# Patient Record
Sex: FEMALE | Race: WHITE
[De-identification: names, ages, dates, MRNs, and addresses within clinical notes are randomized per-mention and may not be internally consistent; named-entity substitution may affect disease eponyms.]

---

## 2018-06-01 ENCOUNTER — HOSPITAL ENCOUNTER (INPATIENT)
Dept: HOSPITAL 75 - ER | Age: 73
LOS: 2 days | Discharge: HOME | DRG: 63 | End: 2018-06-03
Attending: FAMILY MEDICINE | Admitting: FAMILY MEDICINE
Payer: MEDICARE

## 2018-06-01 VITALS — DIASTOLIC BLOOD PRESSURE: 63 MMHG | SYSTOLIC BLOOD PRESSURE: 164 MMHG

## 2018-06-01 VITALS — SYSTOLIC BLOOD PRESSURE: 120 MMHG | DIASTOLIC BLOOD PRESSURE: 62 MMHG

## 2018-06-01 VITALS — SYSTOLIC BLOOD PRESSURE: 131 MMHG | DIASTOLIC BLOOD PRESSURE: 74 MMHG

## 2018-06-01 VITALS — SYSTOLIC BLOOD PRESSURE: 115 MMHG | DIASTOLIC BLOOD PRESSURE: 64 MMHG

## 2018-06-01 VITALS — SYSTOLIC BLOOD PRESSURE: 126 MMHG | DIASTOLIC BLOOD PRESSURE: 69 MMHG

## 2018-06-01 VITALS — WEIGHT: 147 LBS | HEIGHT: 66 IN | BODY MASS INDEX: 23.63 KG/M2

## 2018-06-01 VITALS — DIASTOLIC BLOOD PRESSURE: 85 MMHG | SYSTOLIC BLOOD PRESSURE: 165 MMHG

## 2018-06-01 VITALS — SYSTOLIC BLOOD PRESSURE: 134 MMHG | DIASTOLIC BLOOD PRESSURE: 74 MMHG

## 2018-06-01 VITALS — DIASTOLIC BLOOD PRESSURE: 74 MMHG | SYSTOLIC BLOOD PRESSURE: 145 MMHG

## 2018-06-01 VITALS — SYSTOLIC BLOOD PRESSURE: 146 MMHG | DIASTOLIC BLOOD PRESSURE: 95 MMHG

## 2018-06-01 DIAGNOSIS — R29.709: ICD-10-CM

## 2018-06-01 DIAGNOSIS — R03.0: ICD-10-CM

## 2018-06-01 DIAGNOSIS — I63.9: Primary | ICD-10-CM

## 2018-06-01 DIAGNOSIS — E78.5: ICD-10-CM

## 2018-06-01 LAB
ALBUMIN SERPL-MCNC: 4.4 GM/DL (ref 3.2–4.5)
ALP SERPL-CCNC: 77 U/L (ref 40–136)
ALT SERPL-CCNC: 22 U/L (ref 0–55)
APTT BLD: 27 SEC (ref 24–35)
APTT PPP: YELLOW S
BACTERIA #/AREA URNS HPF: NEGATIVE /HPF
BASOPHILS # BLD AUTO: 0 10^3/UL (ref 0–0.1)
BASOPHILS NFR BLD AUTO: 1 % (ref 0–10)
BILIRUB SERPL-MCNC: 0.5 MG/DL (ref 0.1–1)
BILIRUB UR QL STRIP: NEGATIVE
BUN/CREAT SERPL: 13
CALCIUM SERPL-MCNC: 9.5 MG/DL (ref 8.5–10.1)
CHLORIDE SERPL-SCNC: 105 MMOL/L (ref 98–107)
CO2 SERPL-SCNC: 27 MMOL/L (ref 21–32)
CREAT SERPL-MCNC: 1.06 MG/DL (ref 0.6–1.3)
D DIMER PPP FEU-MCNC: 0.27 UG/ML (ref 0–0.49)
EOSINOPHIL # BLD AUTO: 0.1 10^3/UL (ref 0–0.3)
EOSINOPHIL NFR BLD AUTO: 2 % (ref 0–10)
ERYTHROCYTE [DISTWIDTH] IN BLOOD BY AUTOMATED COUNT: 12.9 % (ref 10–14.5)
FIBRINOGEN PPP-MCNC: CLEAR MG/DL
GFR SERPLBLD BASED ON 1.73 SQ M-ARVRAT: 51 ML/MIN
GLUCOSE SERPL-MCNC: 110 MG/DL (ref 70–105)
GLUCOSE UR STRIP-MCNC: NEGATIVE MG/DL
HCT VFR BLD CALC: 43 % (ref 35–52)
HGB BLD-MCNC: 14.6 G/DL (ref 11.5–16)
INR PPP: 1 (ref 0.8–1.4)
KETONES UR QL STRIP: NEGATIVE
LEUKOCYTE ESTERASE UR QL STRIP: NEGATIVE
LYMPHOCYTES # BLD AUTO: 1.8 X 10^3 (ref 1–4)
LYMPHOCYTES NFR BLD AUTO: 30 % (ref 12–44)
MANUAL DIFFERENTIAL PERFORMED BLD QL: NO
MCH RBC QN AUTO: 31 PG (ref 25–34)
MCHC RBC AUTO-ENTMCNC: 34 G/DL (ref 32–36)
MCV RBC AUTO: 92 FL (ref 80–99)
MONOCYTES # BLD AUTO: 0.5 X 10^3 (ref 0–1)
MONOCYTES NFR BLD AUTO: 8 % (ref 0–12)
NEUTROPHILS # BLD AUTO: 3.7 X 10^3 (ref 1.8–7.8)
NEUTROPHILS NFR BLD AUTO: 60 % (ref 42–75)
NITRITE UR QL STRIP: NEGATIVE
PH UR STRIP: 8 [PH] (ref 5–9)
PLATELET # BLD: 192 10^3/UL (ref 130–400)
PMV BLD AUTO: 10.6 FL (ref 7.4–10.4)
POTASSIUM SERPL-SCNC: 3.7 MMOL/L (ref 3.6–5)
PROT SERPL-MCNC: 7.2 GM/DL (ref 6.4–8.2)
PROT UR QL STRIP: NEGATIVE
PROTHROMBIN TIME: 13 SEC (ref 12.2–14.7)
RBC # BLD AUTO: 4.66 10^6/UL (ref 4.35–5.85)
RBC #/AREA URNS HPF: (no result) /HPF
SODIUM SERPL-SCNC: 142 MMOL/L (ref 135–145)
SP GR UR STRIP: 1.01 (ref 1.02–1.02)
SQUAMOUS #/AREA URNS HPF: (no result) /HPF
UROBILINOGEN UR-MCNC: NORMAL MG/DL
WBC # BLD AUTO: 6.2 10^3/UL (ref 4.3–11)
WBC #/AREA URNS HPF: (no result) /HPF

## 2018-06-01 PROCEDURE — 85025 COMPLETE CBC W/AUTO DIFF WBC: CPT

## 2018-06-01 PROCEDURE — 83735 ASSAY OF MAGNESIUM: CPT

## 2018-06-01 PROCEDURE — 93041 RHYTHM ECG TRACING: CPT

## 2018-06-01 PROCEDURE — 70450 CT HEAD/BRAIN W/O DYE: CPT

## 2018-06-01 PROCEDURE — 70496 CT ANGIOGRAPHY HEAD: CPT

## 2018-06-01 PROCEDURE — 93005 ELECTROCARDIOGRAM TRACING: CPT

## 2018-06-01 PROCEDURE — 71045 X-RAY EXAM CHEST 1 VIEW: CPT

## 2018-06-01 PROCEDURE — 70498 CT ANGIOGRAPHY NECK: CPT

## 2018-06-01 PROCEDURE — 96365 THER/PROPH/DIAG IV INF INIT: CPT

## 2018-06-01 PROCEDURE — 36415 COLL VENOUS BLD VENIPUNCTURE: CPT

## 2018-06-01 PROCEDURE — 84100 ASSAY OF PHOSPHORUS: CPT

## 2018-06-01 PROCEDURE — 80048 BASIC METABOLIC PNL TOTAL CA: CPT

## 2018-06-01 PROCEDURE — 80053 COMPREHEN METABOLIC PANEL: CPT

## 2018-06-01 PROCEDURE — 51702 INSERT TEMP BLADDER CATH: CPT

## 2018-06-01 PROCEDURE — 70553 MRI BRAIN STEM W/O & W/DYE: CPT

## 2018-06-01 PROCEDURE — 92977: CPT

## 2018-06-01 PROCEDURE — 85610 PROTHROMBIN TIME: CPT

## 2018-06-01 PROCEDURE — 99291 CRITICAL CARE FIRST HOUR: CPT

## 2018-06-01 PROCEDURE — 93306 TTE W/DOPPLER COMPLETE: CPT

## 2018-06-01 PROCEDURE — 82962 GLUCOSE BLOOD TEST: CPT

## 2018-06-01 PROCEDURE — 87081 CULTURE SCREEN ONLY: CPT

## 2018-06-01 PROCEDURE — 81000 URINALYSIS NONAUTO W/SCOPE: CPT

## 2018-06-01 PROCEDURE — 93880 EXTRACRANIAL BILAT STUDY: CPT

## 2018-06-01 PROCEDURE — 80061 LIPID PANEL: CPT

## 2018-06-01 PROCEDURE — 84484 ASSAY OF TROPONIN QUANT: CPT

## 2018-06-01 PROCEDURE — 85379 FIBRIN DEGRADATION QUANT: CPT

## 2018-06-01 PROCEDURE — 96361 HYDRATE IV INFUSION ADD-ON: CPT

## 2018-06-01 PROCEDURE — 85730 THROMBOPLASTIN TIME PARTIAL: CPT

## 2018-06-01 NOTE — DIAGNOSTIC IMAGING REPORT
INDICATION: Left-sided weakness. Possible stroke.



COMPARISON: None.



FINDINGS: No acute intracranial hemorrhage, mass effect, or edema

is seen. Gray-white junction is preserved. The ventricles appear

normal. No focal abnormality is demonstrated. Paranasal sinuses

and mastoids are clear as visualized.



IMPRESSION: No evidence of an acute intracranial abnormality.



Findings were phoned to the referring emergency room at 5:50 p.m.

on 06/01/2018 by Dr. Heidi Stover.



Dictated by: 



  Dictated on workstation # [id number]

## 2018-06-01 NOTE — ED NEUROLOGICAL PROBLEM
General


Chief Complaint:  Neuro-Stroke Like Symptoms


Stated Complaint:  R FACIAL DROOP


Source:  patient


Exam Limitations:  no limitations


 (HUI CARRINGTON)





History of Present Illness


Date Seen by Provider:  2018


Time Seen by Provider:  18:01


Initial Comments


to ER with reports of left facial droop.she arrives to ER per EMS from [facility]. She resides in [facility]. She first noted the 

symptoms by her and her  at 1630. She does not smoke or drink any 

alcohol. Her initial blood glucose was 104. Initial blood pressure was 170/90. 

She is not on any anticoagulants.


Severity:  moderate


Associated Symptoms:  No confusion; fatigue, nausea/vomiting (HUI CARRINGTON

)


Timing/Duration:  1-3 hours


Associated Symptoms:  No fever/chills; paresthesia (left-sided face), slurred 

speech, weakness (MARCELA MORALES MD)





Allergies and Home Medications


Allergies


Coded Allergies:  


     rofecoxib (Verified  Allergy, Unknown, 18)





Patient Home Medication List


Home Medication List Reviewed:  Yes


 (MARCELA MORALES MD)





Review of Systems


Constitutional:  see HPI


Eyes:  No Symptoms Reported


Ears, Nose, Mouth, Throat:  no symptoms reported


Respiratory:  no symptoms reported


Cardiovascular:  no symptoms reported


Genitourinary:  no symptoms reported


Musculoskeletal:  no symptoms reported


Skin:  see HPI


Psychiatric/Neurological:  No Symptoms Reported


Endocrine:  No Symptoms Reported


Hematologic/Lymphatic:  No Symptoms Reported (HUI CARRINGTON)


Respiratory:  No cough, No short of breath


Cardiovascular:  No edema


Gastrointestinal:  No nausea, No vomiting (MARCELA MORALES MD)





All Other Systems Reviewed


Negative Unless Noted:  Yes


 (MARCELA MORALES MD)





Past Medical-Social-Family Hx


Past Med/Social Hx:  Reviewed Nursing Past Med/Soc Hx (she had a slight up past 

medical history is high cholesterol as)


 (MARCELA MORALES MD)


Patient Social History


Recent Foreign Travel:  No


Contact w/Someone Who Travel:  No


 (HUI CARRINGTON)





Past Medical History


Cardiac:  Yes


High Cholesterol


 (MARCELA MORALES MD)





Physical Exam


Vital Signs


Capillary Refill :  


 (HUI CARRINGTON)


General Appearance:  WD/WN, no apparent distress


HEENT:  PERRL/EOMI, normal ENT inspection


Neck:  non-tender, full range of motion


Respiratory:  no respiratory distress, no accessory muscle use


Cardiovascular:  regular rate, rhythm, no murmur


Gastrointestinal:  normal bowel sounds, non tender, soft


Neurologic/Psychiatric:  alert, normal mood/affect, oriented x 3


Crainal Nerves:  normal hearing, normal speech, PERRL


Motor/Sensory:  no motor deficit, no sensory deficit, other (her deficits are 

left facial droop which is mild, left arm weakness which is mild, right 

extraocular muscle palsy)


Skin:  normal color, warm/dry (HUI CARRINGTON)


HEENT:  other (pupils are equal and round and reactive.  Patient has palsy that 

appears to be to the right eye.  She has difficulty with tracking and reports 

blurred vision)


Respiratory:  lungs clear (of appropriate effects thism trigeminal 

neuralgiaJune 2018AIN the creatinine is 1.06 and her GFR is 53 and bedside 

by 1 cm questions so I think)


Back:  no CVA tenderness; No muscle spasm


Extremities:  non-tender, no pedal edema, other


Neurologic/Psychiatric:  motor weakness, sensory deficit (left side of face)


Crainal Nerves:  abnormal speech, facial asymmetry


Coordination/Gait:  ABN nose to finger (L)


Motor/Sensory:  pronator drift (L) (mild on left arm), weak motor strength LUE, 

weak motor strength LLE, other (her deficits are left facial droop which is mild

, left arm weakness which is mild, right extraocular muscle palsy) (MARCELA MORALES MD)





Stroke


Onset of Symptoms


Date of Onset of Symptoms:  2018


Time of Symptom Onset:  16:30


Onset of Symptoms:  Yes


Symptoms onset unknown:  Yes


 (HUI CARRINGTON)





NIH Stroke Scale Assessment





 Select: Initial Level of Consciousness: 0=Alert (0), Level of Consciousness-

Questions: 0=Answers both month/age (0), LOC Commands: 0=Performs both tasks (0)

, Gaze: Partial Gaze Palsy alert color alert gaze partial gaze palsy (1), 

Visual Fields: 1=Partial hemianopia (1), Facial Movement (Facial Paresis): 1=

Minor paralysis (1), Motor Function-Arms Right: 0=No drift (0), Motor Function-

Arms Left: 0=No drift (0), Motor Function-Legs Right: 0=No drift (0), Motor 

Function-Legs Left: 0=No drift (0), Limb Ataxia: 0=Absent (0), Sensory: 0=Normal

:no loss (0), Best Language: 0=No aphasia (0), Dysarthria: 1=Mild to moderate 

loss (1), Extinction & Inattention: 0=No abnormality (0), Total: 4





Stroke Thrombolytic Exclusion


Age 18 or Over:  Yes


Acute intenal hemorrhage:  No


History of CVA:  No


Uncontrolled Coagulation Defec:  No


Intracranial Hemorrhage:  No


Severe Hypertension:  No


GI or  Bleed:  No


Subarachnoid Hemorrhage:  No


Intracranial Neoplasm/Aneurysm:  No


Oral Anticoagulants:  No


Surgery or Trauma:  No


Puncture of Non-Compressible V:  No


Recent CPR:  No


Diabetic Hemorrhagic Retinopat:  No


Organ Biopsy:  No


Recent Obstetric Delivery:  No


Glucose:  No


Significant Hepatic Dysfunctio:  No


NIH Stoke Scale >22:  No


Bacterial Endocarditis:  No


Pericarditis:  No


Improving Symptoms:  No


Platelets:  No


TPA Contraindication:  No


 (HUI CARRINGTON)





Progress/Results/Core Measures


Results/Orders


Lab Results





Laboratory Tests








Test


 18


17:50 18


17:55 Range/Units


 


 


White Blood Count


 6.2 


 


 4.3-11.0


10^3/uL


 


Red Blood Count


 4.66 


 


 4.35-5.85


10^6/uL


 


Hemoglobin 14.6   11.5-16.0  G/DL


 


Hematocrit 43   35-52  %


 


Mean Corpuscular Volume 92   80-99  FL


 


Mean Corpuscular Hemoglobin 31   25-34  PG


 


Mean Corpuscular Hemoglobin


Concent 34 


 


 32-36  G/DL





 


Red Cell Distribution Width 12.9   10.0-14.5  %


 


Platelet Count


 192 


 


 130-400


10^3/uL


 


Mean Platelet Volume 10.6 H  7.4-10.4  FL


 


Neutrophils (%) (Auto) 60   42-75  %


 


Lymphocytes (%) (Auto) 30   12-44  %


 


Monocytes (%) (Auto) 8   0-12  %


 


Eosinophils (%) (Auto) 2   0-10  %


 


Basophils (%) (Auto) 1   0-10  %


 


Neutrophils # (Auto) 3.7   1.8-7.8  X 10^3


 


Lymphocytes # (Auto) 1.8   1.0-4.0  X 10^3


 


Monocytes # (Auto) 0.5   0.0-1.0  X 10^3


 


Eosinophils # (Auto)


 0.1 


 


 0.0-0.3


10^3/uL


 


Basophils # (Auto)


 0.0 


 


 0.0-0.1


10^3/uL


 


Glucometer  111 H   MG/DL





 (MARTÍNEZ MOSCOSO STUDENT)





FSBG Bedside Testing


Finger Stick Blood Glucose:  111


Blood Glucose Action Taken:  RN NOTIFIED


 (HUI CARRINGTON)





Progress


Progress Note :  


Progress Note


I have seen and evaluated the patient and agree with above except as indicated.

  I agree and have reviewed the plan of care.  Patient is here with acute onset 

of left facial droop and slurred speech as well as balance deficit and left 

facial numbness.  Onset at 1630.  Here by EMS.  Blood sugar and blood pressure 

noted as above.  Initial stroke screen was 7 and repeat done by me was 9 due to 

left-sided findings and right eye palsy.  Case was discussed with  neuro-

stroke on call, Dr. Ogden, who agrees with TPA.  This was mixed and 

administration initiated at 1815 after discussion her risk and benefits was 

done with the patient and family by me.  Patient clearly states that she wants 

the medication and has been agrees.  We will get CT angiogram of the head and 

neck afterwards and this is been ordered.  183: Creatinine studies show 

patient has GFR of approximately 53 by calculation and I believe the benefits 

outweigh risk of contrast studies.  We will give additional fluid.  Patient 

does have Callahan catheter placed prior to TPA administration and does have 2 IVs 

in place.  Monitor patient.  : Patient has complete resolution of symptoms 

and stroke scale is 0 currently.  Nursing to do dysphasia screen.  TPA is 

complete.  Pending CT angiogram of the head and neck results.  : CT angios 

results noted.  I discussed the case with Dr. Doll.  Patient to be admitted to 

the ICU, inpatient status.  Carotid Doppler ultrasound in the morning as well 

as MRI of brain without contrast in the morning.  Patient remains completely 

resolved.  See nursing documentation.  Patient has passed dysphagia screen.  

Patient family agree with plan.


 (MARCELA MORALES MD)


Initial ECG Impression Date:  2018


Initial ECG Impression Time:  17:51


Initial ECG Rate:  61


Initial ECG Rhythm:  Normal Sinus


Initial ECG Intervals:  Normal


Initial ECG Impression:  Normal


Initial ECG Comparisson:  No Previous ECG Available


Comment


Sinus rhythm with normal axis.  No evidence of ST elevation MI.  No previous 

available for comparison.  Interpreted by me.


 (MARCELA MORALES MD)





Diagnostic Imaging





   Diagonstic Imaging:  Xray


   Plain Films/CT/US/NM/MRI:  chest


Comments


NAME:      BAR OROURKE


[facility] REC#:   [id number]


ACCOUNT#:   [id number]


PT STATUS:   REG ER


:      1945


PHYSICIAN:    HUI CARRINGTON


ADMIT DATE:   18/ER


 ***Signed***


Date of Exam:   18





CHEST 1 VIEW, AP/PA ONLY


 





INDICATION: Stroke.





COMPARISON: None.





FINDINGS: Upright portable view of the chest is obtained. Heart


size is normal. The pulmonary vessels appear unremarkable. There


is no pneumothorax, mediastinal widening, or pleural fluid. There


is a calcified granuloma in the left mid lung. The lungs are


otherwise clear.





IMPRESSION: There is no evidence of an acute cardiopulmonary


abnormality.





Dictated by: 





  Dictated on workstation # [id number]





[id number]





Dict:      18


Trans:      18





Interpreted by:         ARTURO VILLASENOR DO


Electronically signed by:   ARTURO VILLASENOR DO 18








   Diagonstic Imaging:  CT


   Plain Films/CT/US/NM/MRI:  head


Comments


NAME:      BAR OROURKE


[facility] REC#:   [id number]


ACCOUNT#:   [id number]


PT STATUS:   REG ER


:      1945


PHYSICIAN:    HUI CARRINGTON


ADMIT DATE:   18/ER


 ***Signed***


Date of Exam:   18





CT HEAD WO-R/O STROKE


 





INDICATION: Left-sided weakness. Possible stroke.





COMPARISON: None.





FINDINGS: No acute intracranial hemorrhage, mass effect, or edema


is seen. Gray-white junction is preserved. The ventricles appear


normal. No focal abnormality is demonstrated. Paranasal sinuses


and mastoids are clear as visualized.





IMPRESSION: No evidence of an acute intracranial abnormality.





Findings were phoned to the referring emergency room at 5:50 p.m.


on 2018 by Dr. Heidi Villasenor.





Dictated by: 





  Dictated on workstation # [id number]





[id number]





Dict:      18


Trans:      18





Interpreted by:         ARTURO VILLASENOR DO


Electronically signed by:   ARTURO VILLASENOR DO 18 [id number]








   Diagonstic Imaging:  CT


   Plain Films/CT/US/NM/MRI:  other


Comments


NAME:   BAR OROURKE


[facility] REC#:   [id number]


ACCOUNT#:   [id number]


PT STATUS:   REG ER


:   1945


PHYSICIAN:   HUI CARRINGTON


ADMIT DATE:   18/ER


 ***Draft***


Date of Exam:18





CT ANGIO HEAD/NECK








PROCEDURE: CT angiography of the head and CT angiography of the


neck with and without contrast.





TECHNIQUE: Contiguous noncontrast images were obtained from the


skull base through the vertex. After intravenous contrast


administration, helical CT angiography of the neck was performed.


Source data was reformatted into multiple MIP projections.


Delayed post contrast acquisition was also obtained.





INDICATION:  Left-sided weakness. Possible stroke.





FINDINGS: There is good opacification of the aortic arch and


cervical vessels following IV contrast. The right vertebral is


dominant with small left vertebral artery. Both vertebral


arteries do supply the basilar artery. The carotid arteries


appear symmetrical. There is mild calcified plaquing in the


carotid bulbs with no hemodynamic changes of the internal carotid


arteries. The external carotid arteries are widely patent. Both


internal carotid arteries show good enhancement in the carotid


siphon. Minimal atherosclerotic changes noted. The anterior,


posterior and middle cerebral arteries show good enhancement


without evidence of occlusive disease or spasm. The cerebellar


arteries are patent. Delayed images show no evidence of enhancing


lesions intracranially. The surrounding soft tissues of the neck


appear normal.





IMPRESSION:


1. Mild atherosclerotic plaquing within the internal carotid


arteries with no hemodynamic changes.


2. Dominant right vertebral artery with small left vertebral


artery noted throughout from the takeoff to the basilar artery.


3. No evidence of intracranial arterial occlusion or spasm.





  Dictated on workstation # [id number]








Dict:   18


Trans:   18


ALEJANDRINA [id number]





Interpreted by:     PATTI BOB MD


Electronically signed by:  


 (MARCELA MORALES MD)





Departure


Communication (Admissions)


180-  I spoke with Dr. Balnd at this time. Discussed with him the NIH 

deficits and the CT findings. He does recommend proceeding with TPA, then Ct 

angiogram of head/neck. .


- Her NIH stroke scale at this time is back to 0. Her disconjugate gaze has 

completely resolved.


 (HUI CARRINGTON)


Time/Spoke to Admitting Phy:  19:55


 (MARCELA MORALES MD)





Impression





 Primary Impression:  


 Cerebrovascular accident due to cerebral artery occlusion


Disposition:   ADMITTED AS INPATIENT


Condition:  Stable





Admissions


Decision to Admit Reason:  Admit from ER (General)


Decision to Admit/Date:  2018


Time/Decision to Admit Time:  19:55


 (MARCELA MORALES MD)





Departure-Patient Inst.


Referrals:  


NO,LOCAL PHYSICIAN (PCP)


Primary Care Physician











HUI CARRINGTON 2018 18:04


MARTÍNEZ MOSCOSO STUDENT 2018 18:25


MARCELA MORALES MD 2018 18:39

## 2018-06-01 NOTE — DIAGNOSTIC IMAGING REPORT
PROCEDURE: CT angiography of the head and CT angiography of the

neck with and without contrast.



TECHNIQUE: Contiguous noncontrast images were obtained from the

skull base through the vertex. After intravenous contrast

administration, helical CT angiography of the neck was performed.

Source data was reformatted into multiple MIP projections.

Delayed post contrast acquisition was also obtained.



INDICATION:  Left-sided weakness. Possible stroke.



FINDINGS: There is good opacification of the aortic arch and

cervical vessels following IV contrast. The right vertebral is

dominant with small left vertebral artery. Both vertebral

arteries do supply the basilar artery. The carotid arteries

appear symmetrical. There is mild calcified plaquing in the

carotid bulbs with no hemodynamic changes of the internal carotid

arteries. The external carotid arteries are widely patent. Both

internal carotid arteries show good enhancement in the carotid

siphon. Minimal atherosclerotic changes noted. The anterior,

posterior and middle cerebral arteries show good enhancement

without evidence of occlusive disease or spasm. The cerebellar

arteries are patent. Delayed images show no evidence of enhancing

lesions intracranially. The surrounding soft tissues of the neck

appear normal.



IMPRESSION:

1. Mild atherosclerotic plaquing within the internal carotid

arteries with no hemodynamic changes.

2. Dominant right vertebral artery with small left vertebral

artery noted throughout from the takeoff to the basilar artery.

3. No evidence of intracranial arterial occlusion or spasm.



Dictated by: 



  Dictated on workstation # [id number]

## 2018-06-01 NOTE — DIAGNOSTIC IMAGING REPORT
INDICATION: Stroke.



COMPARISON: None.



FINDINGS: Upright portable view of the chest is obtained. Heart

size is normal. The pulmonary vessels appear unremarkable. There

is no pneumothorax, mediastinal widening, or pleural fluid. There

is a calcified granuloma in the left mid lung. The lungs are

otherwise clear.



IMPRESSION: There is no evidence of an acute cardiopulmonary

abnormality.



Dictated by: 



  Dictated on workstation # [id number]

## 2018-06-02 VITALS — SYSTOLIC BLOOD PRESSURE: 128 MMHG | DIASTOLIC BLOOD PRESSURE: 95 MMHG

## 2018-06-02 VITALS — DIASTOLIC BLOOD PRESSURE: 52 MMHG | SYSTOLIC BLOOD PRESSURE: 129 MMHG

## 2018-06-02 VITALS — SYSTOLIC BLOOD PRESSURE: 157 MMHG | DIASTOLIC BLOOD PRESSURE: 79 MMHG

## 2018-06-02 VITALS — SYSTOLIC BLOOD PRESSURE: 167 MMHG | DIASTOLIC BLOOD PRESSURE: 85 MMHG

## 2018-06-02 VITALS — DIASTOLIC BLOOD PRESSURE: 71 MMHG | SYSTOLIC BLOOD PRESSURE: 137 MMHG

## 2018-06-02 VITALS — DIASTOLIC BLOOD PRESSURE: 67 MMHG | SYSTOLIC BLOOD PRESSURE: 135 MMHG

## 2018-06-02 VITALS — SYSTOLIC BLOOD PRESSURE: 141 MMHG | DIASTOLIC BLOOD PRESSURE: 70 MMHG

## 2018-06-02 VITALS — DIASTOLIC BLOOD PRESSURE: 84 MMHG | SYSTOLIC BLOOD PRESSURE: 159 MMHG

## 2018-06-02 VITALS — DIASTOLIC BLOOD PRESSURE: 98 MMHG | SYSTOLIC BLOOD PRESSURE: 134 MMHG

## 2018-06-02 VITALS — SYSTOLIC BLOOD PRESSURE: 133 MMHG | DIASTOLIC BLOOD PRESSURE: 86 MMHG

## 2018-06-02 VITALS — DIASTOLIC BLOOD PRESSURE: 86 MMHG | SYSTOLIC BLOOD PRESSURE: 157 MMHG

## 2018-06-02 VITALS — DIASTOLIC BLOOD PRESSURE: 65 MMHG | SYSTOLIC BLOOD PRESSURE: 112 MMHG

## 2018-06-02 VITALS — SYSTOLIC BLOOD PRESSURE: 132 MMHG | DIASTOLIC BLOOD PRESSURE: 83 MMHG

## 2018-06-02 VITALS — DIASTOLIC BLOOD PRESSURE: 107 MMHG | SYSTOLIC BLOOD PRESSURE: 145 MMHG

## 2018-06-02 VITALS — DIASTOLIC BLOOD PRESSURE: 72 MMHG | SYSTOLIC BLOOD PRESSURE: 146 MMHG

## 2018-06-02 VITALS — SYSTOLIC BLOOD PRESSURE: 121 MMHG | DIASTOLIC BLOOD PRESSURE: 53 MMHG

## 2018-06-02 VITALS — SYSTOLIC BLOOD PRESSURE: 160 MMHG | DIASTOLIC BLOOD PRESSURE: 81 MMHG

## 2018-06-02 VITALS — SYSTOLIC BLOOD PRESSURE: 112 MMHG | DIASTOLIC BLOOD PRESSURE: 64 MMHG

## 2018-06-02 VITALS — DIASTOLIC BLOOD PRESSURE: 58 MMHG | SYSTOLIC BLOOD PRESSURE: 124 MMHG

## 2018-06-02 VITALS — SYSTOLIC BLOOD PRESSURE: 113 MMHG | DIASTOLIC BLOOD PRESSURE: 51 MMHG

## 2018-06-02 VITALS — SYSTOLIC BLOOD PRESSURE: 148 MMHG | DIASTOLIC BLOOD PRESSURE: 74 MMHG

## 2018-06-02 VITALS — DIASTOLIC BLOOD PRESSURE: 58 MMHG | SYSTOLIC BLOOD PRESSURE: 134 MMHG

## 2018-06-02 VITALS — SYSTOLIC BLOOD PRESSURE: 141 MMHG | DIASTOLIC BLOOD PRESSURE: 67 MMHG

## 2018-06-02 VITALS — SYSTOLIC BLOOD PRESSURE: 171 MMHG | DIASTOLIC BLOOD PRESSURE: 83 MMHG

## 2018-06-02 VITALS — SYSTOLIC BLOOD PRESSURE: 131 MMHG | DIASTOLIC BLOOD PRESSURE: 72 MMHG

## 2018-06-02 VITALS — SYSTOLIC BLOOD PRESSURE: 138 MMHG | DIASTOLIC BLOOD PRESSURE: 83 MMHG

## 2018-06-02 VITALS — SYSTOLIC BLOOD PRESSURE: 145 MMHG | DIASTOLIC BLOOD PRESSURE: 74 MMHG

## 2018-06-02 VITALS — SYSTOLIC BLOOD PRESSURE: 156 MMHG | DIASTOLIC BLOOD PRESSURE: 92 MMHG

## 2018-06-02 LAB
BASOPHILS # BLD AUTO: 0 10^3/UL (ref 0–0.1)
BASOPHILS NFR BLD AUTO: 0 % (ref 0–10)
BUN/CREAT SERPL: 17
CALCIUM SERPL-MCNC: 9.2 MG/DL (ref 8.5–10.1)
CHLORIDE SERPL-SCNC: 107 MMOL/L (ref 98–107)
CHOLEST SERPL-MCNC: 178 MG/DL (ref ?–200)
CO2 SERPL-SCNC: 23 MMOL/L (ref 21–32)
CREAT SERPL-MCNC: 0.75 MG/DL (ref 0.6–1.3)
EOSINOPHIL # BLD AUTO: 0 10^3/UL (ref 0–0.3)
EOSINOPHIL NFR BLD AUTO: 1 % (ref 0–10)
ERYTHROCYTE [DISTWIDTH] IN BLOOD BY AUTOMATED COUNT: 12.8 % (ref 10–14.5)
GFR SERPLBLD BASED ON 1.73 SQ M-ARVRAT: > 60 ML/MIN
GLUCOSE SERPL-MCNC: 103 MG/DL (ref 70–105)
HCT VFR BLD CALC: 40 % (ref 35–52)
HDLC SERPL-MCNC: 62 MG/DL (ref 40–60)
HGB BLD-MCNC: 14 G/DL (ref 11.5–16)
LYMPHOCYTES # BLD AUTO: 1.5 X 10^3 (ref 1–4)
LYMPHOCYTES NFR BLD AUTO: 19 % (ref 12–44)
MAGNESIUM SERPL-MCNC: 2.1 MG/DL (ref 1.8–2.4)
MANUAL DIFFERENTIAL PERFORMED BLD QL: NO
MCH RBC QN AUTO: 33 PG (ref 25–34)
MCHC RBC AUTO-ENTMCNC: 35 G/DL (ref 32–36)
MCV RBC AUTO: 93 FL (ref 80–99)
MONOCYTES # BLD AUTO: 0.6 X 10^3 (ref 0–1)
MONOCYTES NFR BLD AUTO: 7 % (ref 0–12)
NEUTROPHILS # BLD AUTO: 5.8 X 10^3 (ref 1.8–7.8)
NEUTROPHILS NFR BLD AUTO: 73 % (ref 42–75)
PHOSPHATE SERPL-MCNC: 3.8 MG/DL (ref 2.3–4.7)
PLATELET # BLD: 189 10^3/UL (ref 130–400)
PMV BLD AUTO: 11.2 FL (ref 7.4–10.4)
POTASSIUM SERPL-SCNC: 4 MMOL/L (ref 3.6–5)
RBC # BLD AUTO: 4.3 10^6/UL (ref 4.35–5.85)
SODIUM SERPL-SCNC: 140 MMOL/L (ref 135–145)
TRIGL SERPL-MCNC: 66 MG/DL (ref ?–150)
VLDLC SERPL CALC-MCNC: 13 MG/DL (ref 5–40)
WBC # BLD AUTO: 7.9 10^3/UL (ref 4.3–11)

## 2018-06-02 RX ADMIN — SODIUM CHLORIDE SCH MLS/HR: 900 INJECTION, SOLUTION INTRAVENOUS at 19:14

## 2018-06-02 RX ADMIN — SODIUM CHLORIDE SCH MLS/HR: 900 INJECTION, SOLUTION INTRAVENOUS at 00:00

## 2018-06-02 RX ADMIN — SODIUM CHLORIDE SCH MLS/HR: 900 INJECTION, SOLUTION INTRAVENOUS at 12:51

## 2018-06-02 RX ADMIN — POTASSIUM CHLORIDE SCH MEQ: 1500 TABLET, EXTENDED RELEASE ORAL at 06:57

## 2018-06-02 RX ADMIN — POTASSIUM CHLORIDE SCH MLS/HR: 200 INJECTION, SOLUTION INTRAVENOUS at 06:56

## 2018-06-02 RX ADMIN — MAGNESIUM SULFATE IN DEXTROSE SCH MLS/HR: 10 INJECTION, SOLUTION INTRAVENOUS at 06:57

## 2018-06-02 RX ADMIN — ASPIRIN SCH MG: 81 TABLET ORAL at 21:46

## 2018-06-02 NOTE — HISTORY & PHYSICAL-HOSPITALIST
History of Present Illness


HPI/Chief Complaint


Pt is a 72yoCF with a PMH of HLD who presented to the ER as a Stroke 

activation. [place] felt very dizzy and 

nauseated. She went to the bathroom to vomit and couldn't stand so they put her 

in a chair and called 911. They also noticed a left sided facial droop. She 

believes she was weak because she couldn't walk well but is not sure if one 

sided was weaker than they other. She was noted to have slurred speech as well. 

She went immediately to CT for head CT and upon return the ER NIH score was 

done and found to be 7 and then repeat was 9. [facility] was called and 

case was discussed with Dr Knapp by ER physician and she was deemed an 

appropriate candidate for TPA. This was administered and within 1 hour she had 

complete resolution of symptoms. This morning she states she's feeling well. 

She denies any weakness or slurred speech. She denies any further facial droop. 

She has eaten already and tolerating that well. She has not gotten up to walk 

yet today though.


Source:  patient


Exam Limitations:  no limitations


Date Seen


18


Time Seen by Provider:  09:00


Attending Physician


Trip Doll MD


PCP


No,Local Physician


Referring Physician





Date of Admission


2018 at 20:00





Home Medications & Allergies


Home Medications


Reviewed patient Home Medication Reconciliation performed by pharmacy 

medication reconciliations technician and/or nursing.


Patients Allergies have been reviewed.





Allergies





Allergies


Coded Allergies


  rofecoxib (Verified Allergy, Unknown, 18)








Past Medical-Social-Family Hx


Past Med/Social Hx:  Reviewed Nursing Past Med/Soc Hx, Reviewed and Corrections 

made


Patient Social History


Marrital Status:  


Alcohol Use:  Denies Use


Recreational Drug Use:  No


Smoking Status:  Never a Smoker


2nd Hand Smoke Exposure:  No


Physical Abuse Screen:  No


Sexual Abuse:  No


Recent Foreign Travel:  No


Contact w/other who traveled:  No


Recent Hopitalizations:  No


Recent Infectious Disease Expo:  No





Immunizations Up To Date


Date of Pneumonia Vaccine:  Oct 1, 2016





Seasonal Allergies


Seasonal Allergies:  No





Past Medical History


Surgeries:  Hysterectomy, Neurological


Cardiac:  High Cholesterol


Pregnant:  No


HEENT:  Cataract


History of Blood Disorders:  No





Family History


Reviewed and Corrections made


Cancer





Review of Systems


Constitutional:  No chills; dizziness; No fever


EENTM:  No blurred vision, No double vision, No nose congestion, No throat pain


Respiratory:  No cough, No dyspnea on exertion, No short of breath


Cardiovascular:  No chest pain, No edema, No palpitations


Gastrointestinal:  No abdominal pain; constipation (chronic); No diarrhea; 

nausea, vomiting


Genitourinary:  No dysuria, No frequency


Musculoskeletal:  No joint pain, No muscle pain; muscle weakness


Skin:  No lesions, No rash


Psychiatric/Neurological:  See HPI; Denies Headache; Weakness


All Other Systems Reviewed


Negative Unless Noted:  Yes (Negative excepted noted.)





Physical Exam


Physical Exam


Vital Signs





Vital Signs - First Documented








 18





 17:39 18:29


 


Temp 96.5 


 


Pulse 60 


 


Resp 18 


 


B/P (MAP) 131/74 (93) 


 


Pulse Ox 98 


 


O2 Delivery  Room Air





Capillary Refill : Less Than 3 Seconds


General Appearance:  No Apparent Distress, WD/WN


HEENT:  PERRL/EOMI, Moist Mucous Membranes; No Scleral Icterus (L), No Scleral 

Icterus (R)


Neck:  Normal Inspection, Supple; No JVD, No Thyromegaly


Respiratory:  Lungs Clear, No Accessory Muscle Use, No Respiratory Distress


Cardiovascular:  Regular Rate, Rhythm, No Murmur


Gastrointestinal:  Normal Bowel Sounds, Non Tender, Soft


Extremity:  Normal Capillary Refill, No Calf Tenderness


Neurologic/Psychiatric:  Alert, Oriented x3, No Motor/Sensory Deficits, Normal 

Mood/Affect, CNs II-XII Norm as Tested; No EOM Palsy, No Facial Droop, No Motor 

Weakness, No Sensory Deficit


Skin:  Normal Color, Warm/Dry





Results


Results/Procedures


Labs


Laboratory Tests


18 17:50








18 03:20








6/3/18 03:19








Patient resulted labs reviewed.


Imaging:  Reviewed Imaging Report





Assessment/Plan


Admission Diagnosis


Acute CVA


Admission Status:  Inpatient Order (span 2 midnights)


Reason for Inpatient Admission:  


stroke, received TPA





Diagnosis/Problems


Diagnosis/Problems





(1) CVA (cerebral vascular accident)


Assessment & Plan:  S/p TPA and now NIH-0


Doing well


Will consult PT/OT 


Passed nursing dysphagia screen


MRI ordered for today


Will get 24 hour post TPA CT Head this evening


Non smoker


Lipid ordered


Echo and dopplers ordered


ASA to start tonight if CT head negative


   Discussed with RN who will have night shift RN follow up


Qualifiers:  


   CVA mechanism:  unspecified  Qualified Codes:  I63.9 - Cerebral infarction, 

unspecified


(2) HLD (hyperlipidemia)


Assessment & Plan:  Continue statin


Qualifiers:  


   Hyperlipidemia type:  mixed hyperlipidemia  Qualified Codes:  E78.2 - Mixed 

hyperlipidemia


(3) Elevated blood pressure reading


Status:  Acute


Assessment & Plan:  Denies history of HTN


Will trend


Within goal currently


Keep below 180/105 as she received TPA





(4) Prophylactic measure


Assessment & Plan:  SCDs


IVF


REgular Diet








Clinical Quality Measures


DVT/VTE Risk/Contraindication:


Risk Factor Score Per Nursin


RFS Level Per Nursing on Admit:  2=Moderate





Stroke:


Date of last known well:  2018


Time of last known well:  16:30


Symptoms onset unknown:  Yes


Quality Measures-Stroke Pt:  Assessed for Rehab (PT,OT,ARU,etc), Lipid panel 

ordered, VTE











TRIP DOLL MD 2018 09:23

## 2018-06-02 NOTE — DIAGNOSTIC IMAGING REPORT
PROCEDURE: MR imaging of the brain with and without contrast.



TECHNIQUE: Multiplanar, multisequence MR imaging of the brain was

performed with and without contrast.



INDICATION: Left-sided weakness



There are no previous MRI brain examinations available for

comparison. The CT head exam performed on 06/01/2018 failed to

show any sign of an acute intracranial abnormality. I have

reviewed that exam and I agree with the interpretation of that

study. However, on the diffusion series of this exam there is a

small focal area of increased signal in the connor on the right.

There is also a vague less intense area of increased signal along

the medial half of the right connor on the diffusion series. There

are corresponding areas of diminished signal in this region on

the apparent diffusion coefficient series. This appearance does

suggest an acute/subacute nonhemorrhagic infarct. There is no

abnormal enhancement of this area on the postcontrast series and

there is no sign of hemorrhage.



No other acute intracranial abnormality is noted. The ventricles

are not abnormally dilated. There is no abnormal signal in the

periventricular white matter on the FLAIR series to suggest

encephalomalacia related to microvascular ischemia.



The sella is not enlarged and the expected carotid flow voids are

evident bilaterally. The orbits are symmetrical and within normal

limits. The sinuses are generally clear. The seventh and eighth

nerve complexes are unremarkable.



IMPRESSION:

1. There is an acute/subacute nonhemorrhagic infarct involving

the right connor. There is no sign of hemorrhage in this area.

2. There is no other acute intracranial abnormality noted.

3. These results were called to Dr. Anna Doll at the time of

this dictation.



CRITICAL FINDING



Dictated by: 



  Dictated on workstation # [id number]

## 2018-06-02 NOTE — DIAGNOSTIC IMAGING REPORT
INDICATION: Dyspnea.



Comparison is made with prior examination from 06/01/2018.



FINDINGS: The heart size, mediastinal configuration, and

pulmonary vascularity are within normal limits. There is no

pleural effusion, pneumothorax, or pneumonia. The osseous

structures are unremarkable. 



IMPRESSION: No acute cardiopulmonary abnormality.



Dictated by: 



  Dictated on workstation # [id number]

## 2018-06-02 NOTE — PHYSICAL THERAPY EVALUATION
PT Evaluation-General


Medical Diagnosis


Admission Date


2018 at 20:00


Medical Diagnosis:  CVA/left sided weakness


Onset Date:  2018





Therapy Diagnosis


Therapy Diagnosis:  debility/weakness





Height/Weight


Height (Feet):  5


Height (Inches):  6.00


Weight (Pounds):  155


Weight (Ounces):  0.0





Precautions


Precautions/Isolations:  Fall Prevention, Standard Precautions





Weight Bear Status


Right Lower Extremity:  Right


Weight Bearing/Tolerated


Left Lower Extremity:  Left


Weight Bearing/Tolerated





Referral


Physician:  Lavon


Reason for Referral:  Evaluation/Treatment





Medical History


Additional Medical History


HLD


Current History


EMS from [facility] with left facial droop and left UE/LE weakness, dizziness and 

nausea/vomiting


Reviewed History:  Yes





Social History


Home:  Single Level


Current Living Status:  Spouse





Prior/Core FIM


Prior Level of Function


              Functional [place] Measure


0=Not Assessed/NA   4=Minimal Assistance


1=Total Assistance   5=Supervision or Setup


2=Maximal Assistance   6=Modified [place]


3=Moderate Assistance   7=Complete [place]


Bed Mobility:  7


Transfers (B,C,W/C) (FIM):  7


Gait:  7


Locomotion:  7





PT Evaluation-Current


Subjective


Patient states, "I just feel shitty."  Agrees to PT.





Pain





   Numeric Pain Scale:  0-No Pain


   Location:  No Pain Reported





Objective


Patient Orientation:  Normal For Age


Problem Solving:  Fair


Attachments:  [place] Catheter, IV





ROM/Strength


ROM Upper Extremities


bilateral UE WFL


ROM Lower Extremities


bilateral LE WNL


Strength Upper Extremities


5/5 grossly bilaterally all planes


Strength Lower Extremities


5/5 grossly bilaterally all planes





Integumentary/Posture


Integumentary


refer to nursing notes


Bowel Incontinence:  No


Bladder Incontinence:  No


Posture


WFL





Neuromuscular


(Tone, Coordination, Reflexes)


grossly intact with all/does display 3 episodes of LOB to left with self 

correction/patient c/o dizziness in upright position





Sensory


Vision:  double vision, however, patient had this prior per her report


Hearing:  Functional


Sensation Right Lower Extremit:  Intact


Sensation Left Lower Extremity:  Intact





Transfers


              Functional [place] Measure


0=Not Assessed/NA   4=Minimal Assistance


1=Total Assistance   5=Supervision or Setup


2=Maximal Assistance   6=Modified [place]


3=Moderate Assistance   7=Complete [place]


Transfers (B, C, W/C) (FIM):  7


Scootin


Rollin


Supine to/from Sit:  7


Sit to/from Stand:  7





Gait


Mode of Locomotion:  Walk


Anticipated Mode of Locomotion:  Walk


Gait (FIM):  5


Distance (FIM):  3=150 ft


Distance:  350'


Gait Level of Assist:  5


Gait Persons Needed:  1


Gait Assistive Device:  None


Comments/Gait Description


3 episodes of LOB to left due to dizziness with patient self correcting





Balance


Sitting Static:  Normal


Sitting Dynamic:  Normal


Standing Static:  Fair


 Standing Dynamic:  Fair





Assessment/Needs


72 y.o. female, will benefit from short term skilled PT to address functional 

mobility to ensure safe return to home with spouse at independent LOF.  Patient 

currently has left facial droop and numbness and dizziness in sit and stand.  

Strength bilateral UE's and LE's are WNL.  Patient did mary socks with set up  

only.


Rehab Potential:  Fair





PT Short Term Goals


Short Term Goals


Time Frame:  2018


Transfers (B,C,W/C) (FIM):  7


Gait (FIM):  7


Distance (FIM):  3=150 ft


Gait Distance Comment:  360'


Gait Level of Assist:  7


Gait Assistive Device:  None





PT Plan


Problem List


Problem List:  Other (dizziness/double vision)





Treatment/Plan


Treatment Plan:  Continue Plan of Care


Treatment Plan:  Education, Functional Activity Bud, Gait, Safety, 

Therapeutic Exercise


Treatment Duration:  2018


Frequency:  5 times per week


Estimated Hrs Per Day:  .5 hour per day


Patient and/or Family Agrees t:  Yes





Discharge Recommendations


Therapy D/C Recommendations:  Home w/ Family Support





Time/GCodes


Time In:  940


Time Out:  1000


Total Billed Treatment Time:  20


Total Billed Treatment


1 visit


EVModC 20 min


G Codes Necessary:  No











HEATHER GLOVER PT 2018 10:13

## 2018-06-02 NOTE — DIAGNOSTIC IMAGING REPORT
PROCEDURE: US carotid duplex, bilateral.



TECHNIQUE: Multiple real-time grayscale images were obtained over

the carotid arteries in various projections, bilaterally.

Additional duplex Doppler and color Doppler images were also

obtained.



INDICATION: CVA.



FINDINGS: There are no focally elevated velocities in either

internal carotid artery. The ICA/CCA ratios are within normal

limits, bilaterally. There is antegrade flow in the vertebral

arteries, bilaterally. Grayscale images demonstrate minimal

carotid plaque, bilaterally.



IMPRESSION: Minimal bilateral carotid plaque however spectral

analysis shows no evidence of a hemodynamically significant

stenosis in either internal carotid artery.



Parameters based on the consensus panel Gray-Scale and Doppler

ultrasound criteria published 

November 2003, Radiology, Volume 229. 



DOPPLER (peak systolic velocity M/S 

    

                Right    Left



CCA              .64     .62



ICA Proximal      .51     .53



ICA Mid           .57     .5

 

ICA Distal        .67     .4



RATIO            1.05     .86



ECA              .98     .8



VERT              .58     .31



Dictated by: 



  Dictated on workstation # [id number]

## 2018-06-02 NOTE — DIAGNOSTIC IMAGING REPORT
PROCEDURE: CT head without contrast.



TECHNIQUE: Multiple contiguous axial images were obtained through

the brain without the use of intravenous contrast.



INDICATION: Left-sided weakness. Post TPA therapy.



FINDINGS: There is no evidence of intracranial hemorrhage. There

is mild cortical atrophy. Periventricular chronic white matter

changes noted. There is no mass effect. Ventricles are not

dilated. Basal cisterns are clear. No extra-axial fluid

collections.



IMPRESSION: No evidence of developing intracranial hemorrhage or

mass effect compared with previous MRI of 06/02/2018.



Dictated by: 



  Dictated on workstation # [id number]

## 2018-06-03 VITALS — SYSTOLIC BLOOD PRESSURE: 167 MMHG | DIASTOLIC BLOOD PRESSURE: 79 MMHG

## 2018-06-03 VITALS — SYSTOLIC BLOOD PRESSURE: 140 MMHG | DIASTOLIC BLOOD PRESSURE: 90 MMHG

## 2018-06-03 VITALS — SYSTOLIC BLOOD PRESSURE: 153 MMHG | DIASTOLIC BLOOD PRESSURE: 69 MMHG

## 2018-06-03 VITALS — DIASTOLIC BLOOD PRESSURE: 81 MMHG | SYSTOLIC BLOOD PRESSURE: 153 MMHG

## 2018-06-03 VITALS — SYSTOLIC BLOOD PRESSURE: 157 MMHG | DIASTOLIC BLOOD PRESSURE: 80 MMHG

## 2018-06-03 VITALS — DIASTOLIC BLOOD PRESSURE: 106 MMHG | SYSTOLIC BLOOD PRESSURE: 148 MMHG

## 2018-06-03 VITALS — DIASTOLIC BLOOD PRESSURE: 72 MMHG | SYSTOLIC BLOOD PRESSURE: 149 MMHG

## 2018-06-03 VITALS — SYSTOLIC BLOOD PRESSURE: 163 MMHG | DIASTOLIC BLOOD PRESSURE: 105 MMHG

## 2018-06-03 VITALS — DIASTOLIC BLOOD PRESSURE: 104 MMHG | SYSTOLIC BLOOD PRESSURE: 158 MMHG

## 2018-06-03 VITALS — DIASTOLIC BLOOD PRESSURE: 76 MMHG | SYSTOLIC BLOOD PRESSURE: 145 MMHG

## 2018-06-03 LAB
BASOPHILS # BLD AUTO: 0 10^3/UL (ref 0–0.1)
BASOPHILS NFR BLD AUTO: 0 % (ref 0–10)
BUN/CREAT SERPL: 10
CALCIUM SERPL-MCNC: 8.7 MG/DL (ref 8.5–10.1)
CHLORIDE SERPL-SCNC: 112 MMOL/L (ref 98–107)
CO2 SERPL-SCNC: 21 MMOL/L (ref 21–32)
CREAT SERPL-MCNC: 0.69 MG/DL (ref 0.6–1.3)
EOSINOPHIL # BLD AUTO: 0.1 10^3/UL (ref 0–0.3)
EOSINOPHIL NFR BLD AUTO: 2 % (ref 0–10)
ERYTHROCYTE [DISTWIDTH] IN BLOOD BY AUTOMATED COUNT: 13 % (ref 10–14.5)
GFR SERPLBLD BASED ON 1.73 SQ M-ARVRAT: > 60 ML/MIN
GLUCOSE SERPL-MCNC: 113 MG/DL (ref 70–105)
HCT VFR BLD CALC: 39 % (ref 35–52)
HGB BLD-MCNC: 13.3 G/DL (ref 11.5–16)
LYMPHOCYTES # BLD AUTO: 1.9 X 10^3 (ref 1–4)
LYMPHOCYTES NFR BLD AUTO: 29 % (ref 12–44)
MAGNESIUM SERPL-MCNC: 2.1 MG/DL (ref 1.8–2.4)
MANUAL DIFFERENTIAL PERFORMED BLD QL: NO
MCH RBC QN AUTO: 31 PG (ref 25–34)
MCHC RBC AUTO-ENTMCNC: 34 G/DL (ref 32–36)
MCV RBC AUTO: 92 FL (ref 80–99)
MONOCYTES # BLD AUTO: 0.6 X 10^3 (ref 0–1)
MONOCYTES NFR BLD AUTO: 8 % (ref 0–12)
NEUTROPHILS # BLD AUTO: 4 X 10^3 (ref 1.8–7.8)
NEUTROPHILS NFR BLD AUTO: 61 % (ref 42–75)
PHOSPHATE SERPL-MCNC: 2.8 MG/DL (ref 2.3–4.7)
PLATELET # BLD: 187 10^3/UL (ref 130–400)
PMV BLD AUTO: 10.8 FL (ref 7.4–10.4)
POTASSIUM SERPL-SCNC: 3.4 MMOL/L (ref 3.6–5)
RBC # BLD AUTO: 4.24 10^6/UL (ref 4.35–5.85)
SODIUM SERPL-SCNC: 144 MMOL/L (ref 135–145)
WBC # BLD AUTO: 6.6 10^3/UL (ref 4.3–11)

## 2018-06-03 RX ADMIN — POTASSIUM CHLORIDE SCH MEQ: 1500 TABLET, EXTENDED RELEASE ORAL at 04:33

## 2018-06-03 RX ADMIN — POTASSIUM CHLORIDE SCH MLS/HR: 200 INJECTION, SOLUTION INTRAVENOUS at 04:29

## 2018-06-03 RX ADMIN — SODIUM CHLORIDE SCH MLS/HR: 900 INJECTION, SOLUTION INTRAVENOUS at 08:09

## 2018-06-03 RX ADMIN — ASPIRIN SCH MG: 81 TABLET ORAL at 08:03

## 2018-06-03 RX ADMIN — MAGNESIUM SULFATE IN DEXTROSE SCH MLS/HR: 10 INJECTION, SOLUTION INTRAVENOUS at 04:33

## 2018-06-03 NOTE — DISCHARGE INST-STROKE W/WO TPA
Discharge Inst-Stroke w/wo TPA


Discharge Medications


New, Converted or Re-Newed RX:  Transmitted to Pharmacy





Patient Instructions


Please schedule a follow up with your primary care provider to follow up this 

hospital stay so they are aware that you had a stroke. Please take your 

medications as written as well.


Additional Follow Up:  Yes


Return to The Hospital For:  


Slurred speech, facial droop, weakness, confusion, or if you feel you are


getting worse





Activity & Diet


Discharge Diet:  Low Fat/Low Cholesterol, Cardiac Diet


Activity as Tolerated:  Yes











TRIP PRINCE MD Tobias 3, 2018 9:15 am

## 2018-06-03 NOTE — DISCHARGE SUMMARY-HOSPITALIST
Diagnosis/Chief Complaint


Date of Admission


2018 at 8:00 pm


Date of Discharge





Discharge Date:  Tobias 3, 2018


Admission Diagnosis


Acute CVA


Discharge Diagnosis





(1) CVA (cerebral vascular accident)


Assessment & Plan:  S/p TPA and now NIH-0


Doing well


Will consult PT/OT 


Passed nursing dysphagia screen


MRI ordered for today


Will get 24 hour post TPA CT Head this evening


Non smoker


Lipid ordered


Echo and dopplers ordered


ASA to start tonight if CT head negative


   Discussed with RN who will have night shift RN follow up





(2) HLD (hyperlipidemia)


Assessment & Plan:  Continue statin





(3) Elevated blood pressure reading


Status:  Acute


Assessment & Plan:  Denies history of HTN


Will trend


Within goal currently


Keep below 180/105 as she received TPA





(4) Prophylactic measure


Assessment & Plan:  Discussed with RN


SCDs








Discharge Summary


Discharge Physical Exam


Allergies:  


Coded Allergies:  


     rofecoxib (Verified  Allergy, Unknown, 18)


Vitals & I&Os





Vital Signs








  Date Time  Temp Pulse Resp B/P (MAP) Pulse Ox O2 Delivery O2 Flow Rate FiO2


 


6/3/18 09:45        


 


6/3/18 09:38  66 14  99 Room Air  


 


6/3/18 08:01 97.5       








General Appearance:  Alert, Oriented X3


Respiratory:  Clear to Auscultation


Cardiovascular:  Regular Rate


Neuro:  Normal Speech, Strength at 5/5 X4 Ext


Psych/Mental Status:  Mental Status NL





Hospital Course


Pt was admitted for stroke like symptoms with NIH of 9. She received TPA on  

with complete resolution of her symptoms and NIH score was 0. She underwent MRI 

with revealed an acute infarct. She had carotid dopplers and an echo which 

revealed no significant abnormalities. Her statin therapy was intensified and 

she was started on ASA. She was evaluated by rehab and deemed safe for DC home 

from their standpoint. She had a 24 hours post TPA CT which showed no evidence 

of hemorrhage. On day of discharge she was requesting DC home and will follow 

up with her PCP.


Labs (last 24 hrs)


Laboratory Tests


6/3/18 03:19: 


White Blood Count 6.6, Red Blood Count 4.24L, Hemoglobin 13.3, Hematocrit 39, 

Mean Corpuscular Volume 92, Mean Corpuscular Hemoglobin 31, Mean Corpuscular 

Hemoglobin Concent 34, Red Cell Distribution Width 13.0, Platelet Count 187, 

Mean Platelet Volume 10.8H, Neutrophils (%) (Auto) 61, Lymphocytes (%) (Auto) 29

, Monocytes (%) (Auto) 8, Eosinophils (%) (Auto) 2, Basophils (%) (Auto) 0, 

Neutrophils # (Auto) 4.0, Lymphocytes # (Auto) 1.9, Monocytes # (Auto) 0.6, 

Eosinophils # (Auto) 0.1, Basophils # (Auto) 0.0, Sodium Level 144, Potassium 

Level 3.4L, Chloride Level 112H, Carbon Dioxide Level 21, Anion Gap 11, Blood 

Urea Nitrogen 7, Creatinine 0.69, Estimat Glomerular Filtration Rate > 60, BUN/

Creatinine Ratio 10, Glucose Level 113H, Calcium Level 8.7, Phosphorus Level 2.8

, Magnesium Level 2.1





Microbiology


18 MRSA Screen - Final, Complete


         MRSA not isolated


Patient resulted labs reviewed.


Pending Labs





Imaging:  Reviewed Imaging Report





Discussion & Recommendations


Discharge Planning:  >30 minutes discharge planning





Discharge


Home Medications:





Active Scripts


Active


Aspirin EC (Aspirin) 81 Mg Tablet.dr 81 Mg PO DAILY


Lipitor (Atorvastatin Calcium) 40 Mg Tablet 40 Mg PO HS


Reported


Melatonin 5 Mg Tab.ir.er 5 Mg PO HS


Trazodone HCl 50 Mg Tablet 50 Mg PO HS





Instructions to patient/family


Please see electronic discharge instructions given to patient.





Clinical Quality Measures


DVT/VTE Risk/Contraindication:


Risk Factor Score Per Nursin


RFS Level Per Nursing on Admit:  2=Moderate





Stroke:


Date of last known well:  2018


Time of last known well:  16:30


Symptoms onset unknown:  Yes


Quality Measures-Stroke Pt:  Assessed for Rehab (PT,OT,ARU,etc), Lipid panel 

ordered, VTE





Problem Qualifiers





(1) CVA (cerebral vascular accident):  


CVA mechanism:  unspecified  Qualified Codes:  I63.9 - Cerebral infarction, 

unspecified


(2) HLD (hyperlipidemia):  


Hyperlipidemia type:  mixed hyperlipidemia  Qualified Codes:  E78.2 - Mixed 

hyperlipidemia








TRIP PRINCE MD Tobias 3, 2018 09:16